# Patient Record
Sex: MALE | Race: WHITE | Employment: FULL TIME | URBAN - METROPOLITAN AREA
[De-identification: names, ages, dates, MRNs, and addresses within clinical notes are randomized per-mention and may not be internally consistent; named-entity substitution may affect disease eponyms.]

---

## 2021-01-07 ENCOUNTER — HOSPITAL ENCOUNTER (OUTPATIENT)
Dept: PHYSICAL THERAPY | Age: 46
Discharge: HOME | End: 2021-01-07
Payer: COMMERCIAL

## 2021-01-07 PROCEDURE — 97161 PT EVAL LOW COMPLEX 20 MIN: CPT

## 2021-01-07 NOTE — PROGRESS NOTES
[address] [facility] [address]  Phone: [phone] Fax: [phone] / STATEMENT OF MEDICAL NECESSITY FOR PHYSICAL THERAPY SERVICES  Patient Name: Vargas Cerda : 1975   Medical   Diagnosis: Chronic lower back pain [M54.5, G89.29] Treatment Diagnosis: Chronic LBP   Onset Date: 15 years ago      Referral Source: Fab Preston MD Start of Care [facility]): 2021   Prior Hospitalization: See medical history Provider #: [id number]   Prior Level of Function: Running 2 miles often   Comorbidities: None 1   Medications: Verified on Patient Summary List   The Plan of Care and following information is based on the information from the initial evaluation.   ========================================================================  Assessment / key information:  Pt is a 42 yo male s/p new onset of back pain 15 years ago with no WOLF. Main c/o pain is L > R lumbar spine pain that only bothers him after sleeping 6 hours and after long duration standing > 3-4 hours. 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 CC is back pain that wakes him from sleep. He can sleep 6 hours and then must do a stretching routine to avoid back pain further into the night. Several weeks ago pt noted an increase in intensity secondary to heavy lifting, moving, chopping wood (20 min). Pt can sit for 6 hours in the car to drive to [place] with no limitations. Social: running 2 miles (running 1 mile and run/walk the second); 10 min pace on ave  Sleeping positions: when pain is bad he sleeps prone over a large pillow and a pillow under R arm with R C/S rotation. Also sleeps SL with pillow between legs with an improvement in pain.  Pt worse with a mattress >1 year old. Pt can sleep supine without excessive issue. When he wakes at 6 hours, he does: forward flexion with (B) bias 15\", SB stretching in standing, forward quadrant stretching, lumbar rotation into open book position. Previous rx has included the following: exercise, nightly stretching, change of mattress, imaging noting L5 sacralization; MD noting DDD is more likely working dx to account for his LBP. He presents with pain ranging from 0-6/10, located lumbar spine, paraspainals at L4-5 and just proximal to the L PSIS. Pain is made worse with pronged sleeping > 6 hours, standing >3-4, tall kneeling and rotational activities for long duration; better with light to moderate activities, transitional movements often. Denies  red flags, no Bowel and bladder sxs. Pt has no c/o radicular sxs in the (B) LE. Notes no weakness, foot drop. Visual Inspection reveals WNL. Palpation reveals TTP L4-5 paraspinals, just proximal to L PSIS, L > R QL. Possible L spinal rotation at L4. Posture fair to poor in sitting. Gait WNL with adequate and equal trunk rotation, hip extension. Sensation is intact to light touch. L/S AROM: flexion to ankles, extension 50%, %, % with L c/o stretching, LRotation 100% with stretching; PROM rotation p!;  RRotation 100% with stretching, PROM no pain. Hip Flexion: AROM 120 ; Prone IR/ER: R WNL and no pain, L pain with end range IR. MMT LEs: hip flexion5/5, extension 4+/5 with proper hip ext firing crossed patterns, abduction 4+/5, adduction NT, knee flexion 4+/5, knee extension 5/5, ankle 5/5. Core stability is bridge 75% with no pain. Full plank 30\". With weakness  Repeated movement testing reveals flexion bias. (-) Special tests include: slump, Slr, SIJ cluster testing.  (+) Special tests include: HAMIDA + for tightness. HS 90/90 (R) +, (L) +.; ELy + (B).   Liam: L  Hip flexor;  R ITB, hip flexor  Functional limitations include: sleeping >6 hours, chopping wood, moving things, lifting, prolonged standing > 3-4 hours; tall kneeling with rotation. Pt will benefit from PT interventions to address the aforementioned deficits and allow pt to return to PLOF.   ========================================================================  Eval Complexity: History: LOW Complexity : Zero comorbidities / personal factors that will impact the outcome / POCExam:HIGH Complexity : 4+ Standardized tests and measures addressing body structure, function, activity limitation and / or participation in recreation  Presentation: MEDIUM Complexity : Evolving with changing characteristics  Clinical Decision Making:LOW Complexity : FOTO score of 75-100Overall Complexity:LOW   Problem List: pain affecting function, decrease ROM, decrease strength, impaired gait/ balance, decrease ADL/ functional abilitiies, decrease activity tolerance, decrease flexibility/ joint mobility and decrease transfer abilities   Treatment Plan may include any combination of the following: Therapeutic exercise, Therapeutic activities, Neuromuscular re-education, Physical agent/modality, Gait/balance training, Manual therapy, Aquatic therapy, Patient education, Self Care training, Functional mobility training, Home safety training and Stair training  Patient / Family readiness to learn indicated by: asking questions, trying to perform skills and interest  Persons(s) to be included in education: patient (P)  Barriers to Learning/Limitations:  none  Measures taken:    Patient Goal (s): \"Better Sleep; at least 8 hours\"   Patient self reported health status: excellent  Rehabilitation Potential: excellent   Short Term Goals: To be accomplished in  2  treatments:  1. Pt will be independent and compliant with HEP to decrease pain, increase ROM and return pt to PLOF.  Long Term Goals: To be accomplished in  8-12  treatments:  1.  Pt will increase score on the FOTO to > or = 86/100 to demo an increase in functional activity tolerance. 2. Pt will be able to self-manage ave pain to < or = 1/10 to improve ease of posture, mobility and self-care  3. Pt will be able to sleep >  Or = 8 hours at a time without waking due to pain to restore his PLOF. 4. Pt will have a negative Liam test (B) to improve his pelvic and spinal mobility and decrease co pain. Frequency / Duration:   Patient to be seen  2  times per month for 2-4   treatments:  Patient / Caregiver education and instruction: self care, activity modification and exercises  Therapist Signature: Dion Gillespie DPT Date: 0/3/4331   Certification Period: Na  Time: 9:33 AM   ========================================================================  I certify that the above Physical Therapy Services are being furnished while the patient is under my care. I agree with the treatment plan and certify that this therapy is necessary. Physician Signature:        Date:       Time:   Please sign and return to [facility] or you may fax the signed copy to [phone]. Thank you.

## 2021-01-07 NOTE — PROGRESS NOTES
PT DAILY TREATMENT NOTE     Patient Name: Fitz Álvarez  Date:2021  : 1975  [x]  Patient  Verified  Payor: BLUE CROSS / Plan: [address] [place] [address] / Product Type: PPO /    In time:12;19  Out time:1:05  Total Treatment Time (min): 46  Total Timed Codes (min): 46  1:1 Treatment Time (min): 55   Visit #: 1 of 4    Treatment Area: Chronic lower back pain [M54.5, G89.29]    SUBJECTIVE  Pain Level (0-10 scale): 0  Any medication changes, allergies to medications, adverse drug reactions, diagnosis change, or new procedure performed?: [x] No    [] Yes (see summary sheet for update)  Subjective functional status/changes:   [] No changes reported  SEE IE for Subjective Information    OBJECTIVE              x min Patient Education: [x] Review HEP    [] Progressed/Changed HEP based on:   [] positioning   [] body mechanics   [] transfers   [] heat/ice application        Other Objective/Functional Measures:   Review HEP     Pain Level (0-10 scale) post treatment: 0    ASSESSMENT/Changes in Function: flexion bias. Assess response to HEP including flexion and stretching for asymmetries. Increase core strength. Possible Mobs to lumbar spine NV. Patient will continue to benefit from skilled PT services to modify and progress therapeutic interventions, address functional mobility deficits, address ROM deficits, address strength deficits, analyze and address soft tissue restrictions, analyze and cue movement patterns, analyze and modify body mechanics/ergonomics, assess and modify postural abnormalities, address imbalance/dizziness and instruct in home and community integration to attain remaining goals.      [x]  See Plan of Care  []  See progress note/recertification  []  See Discharge Summary         Progress towards goals / Updated goals:  SEE IE FOR GOALS     PLAN  []  Upgrade activities as tolerated     []  Continue plan of care  []  Update interventions per flow sheet       []  Discharge due to:_  [x]  Other:Initiate POC as stated in the IE      Justification for Eval Code Complexity:  Patient History : none  Examination see IE  Clinical Presentation: evolving with changing   Clinical Decision Making : FOTO 83/100     Liberty Julien DPT 1/7/2021  9:32 AM    Future Appointments   Date Time Provider Sanjuana Hampton   1/7/2021 12:15 PM Antonina Ribera, PT [facility]

## 2021-01-14 NOTE — PROGRESS NOTES
Request for use of Dry Needling/Intramuscular Manual Therapy  Patient: Rico Morgan     Referral Source: Amber Ramos MD  Diagnosis: Chronic lower back pain [M54.5, G89.29]      : 1975  Date of initial visit: 21   Attended visits: 1  Missed Visits: 0    Based on findings from the physical therapy examination and evaluation, the evaluating therapist believes the patient, Rico Morgan  would benefit from including Dry Needling as part of the plan of care. Dry needling is a treatment technique utilized in conjunction with other PT interventions to inactivate myofascial trigger points and the pain and dysfunction they cause. Dry Needling is an advanced procedure that requires additional training of intensive course work. PROCEDURE:   Solid filament sterile needle (typically 0.3mm/30 gauge) inserted into a trigger point   Repeated movements inactivate the trigger points, taking 30-60 seconds per site   Typically consists of 1 dry needling session per week and a possible second treatment including muscle re-education, flexibility, strengthening and other manual techniques to facilitate the benefits of dry needling     BENEFITS:   Inactivation of trigger points   Decreased pain   Increased muscle length   Improved movement patterns   Restoration of function POTENTIAL RISKS:   Post-needling soreness   Infection   Bruising/bleeding   Penetration of a nerve   Pneumothorax   All treating PTs have been thoroughly educated in avoiding adverse reactions    If you agree with this recommendation, please sign this form and fax it to us at [phone]. If you have questions or concerns regarding dry needling or any other treatment we may be providing, please contact us at [phone]    Thank you for allowing us to assist in the care of your patient.   Landry Gillespie, PT    2021 10:00 AM     NOTE TO PHYSICIAN:  PLEASE COMPLETE THE ORDERS BELOW AND   FAX TO [facility]: [phone]  If you are unable to process this request in 24 hours please contact our office:   [phone] I have read the above request and AGREE to the recommendation of including dry needling as part of the plan of care. ? I have read the above request and DO NOT AGREE to including dry needling as part of the plan of care.   ? I have read the above report and request that my patient continue therapy with the following changes/special instructions:  ________________________________________________________________________    Physicians signature: _______________________________________________     Date: ______________Time:_______________

## 2021-01-21 ENCOUNTER — HOSPITAL ENCOUNTER (OUTPATIENT)
Dept: PHYSICAL THERAPY | Age: 46
End: 2021-01-21
Payer: COMMERCIAL

## 2021-01-28 ENCOUNTER — HOSPITAL ENCOUNTER (OUTPATIENT)
Dept: PHYSICAL THERAPY | Age: 46
Discharge: HOME | End: 2021-01-28
Payer: COMMERCIAL

## 2021-01-28 PROCEDURE — 97110 THERAPEUTIC EXERCISES: CPT

## 2021-01-28 PROCEDURE — 97112 NEUROMUSCULAR REEDUCATION: CPT

## 2021-01-28 PROCEDURE — 97140 MANUAL THERAPY 1/> REGIONS: CPT

## 2021-01-28 NOTE — PROGRESS NOTES
PT DAILY TREATMENT NOTE 8-    Patient Name: Faraz Profit  Date:2021  : 1975  [x]  Patient  Verified  Payor: BLUE CROSS / Plan: [address] [place] [address] / Product Type: PPO /    In time:5:52  Out time:6:39  Total Treatment Time (min): 47  Total Timed Codes (min): 47  1:1 Treatment Time (min): 47   Visit #: 2 of 4    Treatment Area: Chronic lower back pain [M54.5, G89.29]    SUBJECTIVE  Pain Level (0-10 scale): 0  Any medication changes, allergies to medications, adverse drug reactions, diagnosis change, or new procedure performed?: [x] No    [] Yes (see summary sheet for update)  Subjective functional status/changes:   [] No changes reported  Friday last I was sitting in my office chair and I fell to the floor and re-injured my R shoulder. So i'm sore in the R arm. I did the flexion exercises: I felt It hard to get to that stretch space for that particular area in my back. So I'm not sure that I felt more changes. Although my lower back hasn't been bothering me during the day at all, but just in the morning. The stretch that helps me is the crossover knee to chest and lumbar flexion stretch.      OBJECTIVE    15 min Therapeutic Exercise:  [x] See flow sheet : initiate new HEP, postural training     Rationale: increase ROM and increase strength to improve the patients ability to perform tall kneeling, sustained prone positioning for sleep     10 min Manual Therapy:  MET for L L4 rotation (L SL in lumbar flexion to point of L4 movement; then  Raise feet with L hip going into ER, R hip going into IR; perform MET isometric contraction with PT for 5-8\", 3-4 times, increasing hip ROM each rep) ; IASTM to (L) L4 paraspinals and QL   Rationale: decrease pain, increase ROM and increase tissue extensibility to ipmrove ease of sustained prone positioning for sleep, tall kneeling positions   The manual therapy interventions were performed at a separate and distinct time from the therapeutic activities interventions   (na Add 59 Modifier in conjunction with TA treatment)     22 min Neuromuscular Re-education:  [x]  See flow sheet : initiate all NMR on flow   Rationale: improve coordination and increase proprioception  to improve the patients ability to sustain tall kneeling, lumbar flexion, standing, walking, working          x min Patient Education: [x] Review HEP    [x] Progressed/Changed HEP based on: reviewed scorpion stretch (B) on large mat table; new HEP, to do MET at home daily. Review possible sleeping positions of supine with wedge for lumbar flexion; semi prone and semi supine with pillow support. [] positioning   [] body mechanics   [] transfers   [] heat/ice application        Other Objective/Functional Measures:   Side glide: pain with (B) side glies; noted more as stretch and min pain  Extension: pain during movement   Rotation: 100% and no pain  SB R with L pain      LIfts in tall kneeling from L hip to the R: pt notes mm fatigue in the area where he c/o pain typically. Pain Level (0-10 scale) post treatment: 0    ASSESSMENT/Changes in Function: Pt with neutral lumbar rotation following MET. Verbalize understanding of MET at home and new HEP (green band given). Pt with core weakness demo'ed throughout program today but ady in weakness and in tall kneeling, SLS positions. Patient will continue to benefit from skilled PT services to modify and progress therapeutic interventions, address functional mobility deficits, address ROM deficits, address strength deficits, analyze and address soft tissue restrictions, analyze and cue movement patterns, analyze and modify body mechanics/ergonomics, assess and modify postural abnormalities, address imbalance/dizziness and instruct in home and community integration to attain remaining goals.      []  See Plan of Care  []  See progress note/recertification  []  See Discharge Summary         Progress towards goals / Updated goals:  · Short Term Goals: To be accomplished in  2  treatments:  1. Pt will be independent and compliant with HEP to decrease pain, increase ROM and return pt to PLOF. Current: notes compliance  (1/28/2021)    · Long Term Goals: To be accomplished in  8-12  treatments:  1. Pt will increase score on the FOTO to > or = 86/100 to demo an increase in functional activity tolerance. 2. Pt will be able to self-manage ave pain to < or = 1/10 to improve ease of posture, mobility and self-care  Current: Pt with less pain during the day; con't to note pain in the mornings (1/28/21)   3. Pt will be able to sleep >  Or = 8 hours at a time without waking due to pain to restore his PLOF. Current: Slow progressing, with 6 hours and then needing to shift positions (1/28/2021)  4. Pt will have a negative Liam test (B) to improve his pelvic and spinal mobility and decrease co pain. Current: will formally reassess NV (1/28/2021)    PLAN  [x]  Upgrade activities as tolerated     [x]  Continue plan of care  []  Update interventions per flow sheet       []  Discharge due to:_  [x]  Other:_ assess for L4 rotation, pain and increased ROm/sleeping tolerance.       Silva Banuelos, PT 1/28/2021  12:59 PM    Future Appointments   Date Time Provider Sanjuana Hampton   1/28/2021  5:45 PM Liyah Wade, PT [facility]   2/4/2021 11:30 AM Liyah Wade, PT [facility]

## 2021-02-04 ENCOUNTER — HOSPITAL ENCOUNTER (OUTPATIENT)
Dept: PHYSICAL THERAPY | Age: 46
Discharge: HOME | End: 2021-02-04
Payer: COMMERCIAL

## 2021-02-04 PROCEDURE — 97140 MANUAL THERAPY 1/> REGIONS: CPT

## 2021-02-04 PROCEDURE — 97112 NEUROMUSCULAR REEDUCATION: CPT

## 2021-02-04 PROCEDURE — 97110 THERAPEUTIC EXERCISES: CPT

## 2021-02-04 NOTE — PROGRESS NOTES
[address] [facility] [address]  Phone: [phone] Fax: [phone]  DISCHARGE NOTE  Patient Name: Michelle Bryant : 1975   Treatment/Medical Diagnosis: Chronic lower back pain [M54.5, G89.29]   Referral Source: Alec Freeman MD     Date of Initial Visit: 21 Attended Visits: 3 Missed Visits: 0     SUMMARY OF TREATMENT   Pt is a 40 yo male s/p new onset of back pain 15 years ago with no WOLF. Main c/o pain is L > R lumbar spine pain that only bothers him after sleeping 6 hours and after long duration standing > 3-4 hours. Ther ex including strengthening, ROM, flexibility, stabilization; manual therapy including: MET for L4 L rotation, IASTM, hip stretching;  Patient education; HEP, NMRed    CURRENT STATUS  Pt is making good objective progress in therapy with an increase in ROM, strength, core stab and functional movement tolerance. However he con't to note limitations with sustained sleeping > 6 hours, in prone positioning. Pt will attempt to purchase new mattress to provide support and also to address any other non Musculoskeletal related sleeping limitations. He demo's an improvement in core stab and mm flexibility but still limited in hip flexor and quad length which reproduces c/o LBP; pt educated on importance of flexibility routine during the week. Pt goal: have the weekly routine of HEP that keeps me from getting deconditioned and getting pain when I go do higher level activities. FOTO 94/100   Pain ranges: 0 to 3-4/10; ave 0/10      L/S AROM: flexion to floor, extension 100%, LSB 100%, RSB 100% with L c/o stretching, LRotation 100% with stretching; PROM rotation p!;  RRotation 100% with stretching. (B) side glide 100% and min stretch felt with R side glide.     Hip Flexion: AROM 120 ; Prone IR/ER: B WNL and no pain,   MMT LEs: hip flexion5/5, extension 4+/5 with proper hip ext firing crossed patterns, abduction 5/5, adduction NT, knee flexion 4+/5, knee extension 5/5, ankle 5/5. Core stability is bridge 100% with no pain.  Full plank 43\"  Repeated movement testing reveals flexion bias. Min L rotation of L4        (-) Special tests include: slump, Slr, SIJ cluster testing.  (+) Special tests include: HAMIDA + for tightness minimally    HS 90/90 (R) +, (L) +.; ELy + (B) with c/o LBP R > L.  Liam: L  Hip flexor;  R ITB, hip flexor    Functional limitations include: sleeping >6 hours, chopping wood (have not done it but could do it);  moving things, prolonged standing > 3-4 hours (has not tested yet); tall kneeling with rotation (has not tested yet); Improvements: lifting, moving, carrying;       · Short Term Goals: To be accomplished in  2  treatments:  1.  Pt will be independent and compliant with HEP to decrease pain, increase ROM and return pt to PLOF. Current: Goal met notes compliance  (2/4/2021)    · Long Term Goals: To be accomplished in  8-12  treatments:  1. Pt will increase score on the FOTO to > or = 86/100 to demo an increase in functional activity tolerance. Goal met at 94/100 (2/4/21)  2.  Pt will be able to self-manage ave pain to < or = 1/10 to improve ease of posture, mobility and self-care  Current: Goal met with 0/10 ave pain during theday; max pain 3-4/10  (2/4/21)   3. Pt will be able to sleep >  Or = 8 hours at a time without waking due to pain to restore his PLOF.    Current: Slow progressing, with 6 hours and then needing to shift positions; discussed other possible limitations to sleep  (2/4/2021)  4. Pt will have a negative Liam test (B) to improve his pelvic and spinal mobility and decrease co pain.   Current: Goal progressing but with + L for hip flexor and + R for ITB and hip flexor; pt notes intermittent compliance with stretching routine (2/4/21)     G-Codes: NA  RECOMMENDATIONS  Recommend D/C as pt has made some functional gains; pt will make some changes/alterations at home with HEP compliance, bed firmness and with core strength program to con't to progress in some work hardening activities. If you have any questions/comments please contact us directly at [phone]. Thank you for allowing us to assist in the care of your patient. Therapist Signature: Janine Burr DPT Date: 2/4/2021     Time: 9:36 AM   NOTE TO PHYSICIAN:  PLEASE COMPLETE THE ORDERS BELOW AND FAX TO   [facility]: [phone]. If you are unable to process this request in 24 hours please contact our office: [phone].  ___ I have read the above report and request that my patient continue as recommended.   ___ I have read the above report and request that my patient continue therapy with the following changes/special instructions:_________________________________________________________   ___ I have read the above report and request that my patient be discharged from therapy.      Physician Signature:        Date:       Time:

## 2021-02-04 NOTE — PROGRESS NOTES
PT DAILY TREATMENT NOTE     Patient Name: Tam Rosario  Date:2021  : 1975  [x]  Patient  Verified  Payor: [employer] [place] / Plan: [address] [place] [address] / Product Type: PPO /    In time:11:27  Out time:12:14  Total Treatment Time (min): 47  Total Timed Codes (min): 47  1:1 Treatment Time (min): 47   Visit #: 3 of 4    Treatment Area: Chronic lower back pain [M54.5, G89.29]    SUBJECTIVE  Pain Level (0-10 scale): 0  Any medication changes, allergies to medications, adverse drug reactions, diagnosis change, or new procedure performed?: [x] No    [] Yes (see summary sheet for update)  Subjective functional status/changes:   [] No changes reported  The shoulder is better  Deficits: the prone sleeping for > 6 hours is still hard  I can tell that doing some diagonals and core control is needed  Hard to do resistance training at home. want to get a firmer mattress; so it was hard to do the MET rotational movements. Modified plank: feels good for exercising in general.        Pt goal: have the weekly routine of HEP that keeps me from getting deconditioned and getting pain when I go do higher level activities.       OBJECTIVE    17 min Therapeutic Exercise:  [x] See flow sheet : reassessment    Rationale: increase ROM, increase strength and improve coordination to improve the patients ability to control ADLs, mobility,  Work activities     10 min Manual Therapy:  MET for L L4 rotation (L SL in lumbar flexion to point of L4 movement; then  Raise feet with L hip going into ER, R hip going into IR; perform MET isometric contraction with PT for 5-8\", 3-4 times, increasing hip ROM each rep) ; IASTM to (L) L4 paraspinals and QL   Rationale: decrease pain, increase ROM, increase tissue extensibility and decrease trigger points to improve ease of rotation, half kneeling, sleeping prone   The manual therapy interventions were performed at a separate and distinct time from the therapeutic activities interventions (na Add 61 Modifier in conjunction with TA treatment)    20 min Neuromuscular Re-education:  [x]  See flow sheet :   Rationale: improve coordination, improve balance and increase proprioception  to improve the patients ability to improve spinal, postural control for ADLs, work, mobility           x min Patient Education: [x] Review HEP    [x] Progressed/Changed HEP based on:  Final D/C planning; reviewed current HEP and modifed Liam to a standing hip flexor stretch   HEP progression tall kneeling, 1/2 kneeling and semisquatting to simulate functional positions for his work and hobbies     [] positioning   [] body mechanics   [] transfers   [] heat/ice application        Other Objective/Functional Measures:   FOTO 94/100   Pain ranges: 0 to 3-4/10; ave 0/10     L/S AROM: flexion to floor, extension 100%, %, % with L c/o stretching, LRotation 100% with stretching; PROM rotation p!;  RRotation 100% with stretching. (B) side glide 100% and min stretch felt with R side glide. Hip Flexion: AROM 120 ; Prone IR/ER: B WNL and no pain,   MMT LEs: hip flexion5/5, extension 4+/5 with proper hip ext firing crossed patterns, abduction 5/5, adduction NT, knee flexion 4+/5, knee extension 5/5, ankle 5/5. Core stability is bridge 100% with no pain. Full plank 43\"  Repeated movement testing reveals flexion bias.      (-) Special tests include: slump, Slr, SIJ cluster testing.  (+) Special tests include: HAMIDA + for tightness minimally    HS 90/90 (R) +, (L) +.; ELy + (B) with c/o LBP R > L. Liam: L  Hip flexor;  R ITB, hip flexor  Functional limitations include: sleeping >6 hours, chopping wood (have not done it but could do it);  moving things, prolonged standing > 3-4 hours (has not tested yet); tall kneeling with rotation (has not tested yet);    Improvements: lifting, moving, carrying;     Pain Level (0-10 scale) post treatment:  0    ASSESSMENT/Changes in Function: see D/C note    Patient will continue to benefit from skilled PT services to modify and progress therapeutic interventions, address functional mobility deficits, address ROM deficits, address strength deficits, analyze and address soft tissue restrictions, analyze and cue movement patterns, analyze and modify body mechanics/ergonomics, assess and modify postural abnormalities, address imbalance/dizziness and instruct in home and community integration to attain remaining goals. []  See Plan of Care  []  See progress note/recertification  [x]  See Discharge Summary         Progress towards goals / Updated goals: · Short Term Goals: To be accomplished in  2  treatments:  1.  Pt will be independent and compliant with HEP to decrease pain, increase ROM and return pt to PLOF. Current: Goal met notes compliance  (2/4/2021)    · Long Term Goals: To be accomplished in  8-12  treatments:  1. Pt will increase score on the FOTO to > or = 86/100 to demo an increase in functional activity tolerance. Goal met at 94/100 (2/4/21)  2.  Pt will be able to self-manage ave pain to < or = 1/10 to improve ease of posture, mobility and self-care  Current: Goal met with 0/10 ave pain during theday; max pain 3-4/10  (2/4/21)   3. Pt will be able to sleep >  Or = 8 hours at a time without waking due to pain to restore his PLOF.    Current: Slow progressing, with 6 hours and then needing to shift positions; discussed other possible limitations to sleep  (2/4/2021)  4. Pt will have a negative Liam test (B) to improve his pelvic and spinal mobility and decrease co pain.   Current: Goal progressing but with + L for hip flexor and + R for ITB and hip flexor; pt notes intermittent compliance with stretching routine (2/4/21)     PLAN  []  Upgrade activities as tolerated     []  Continue plan of care  []  Update interventions per flow sheet       [x]  Discharge due to:_functional gains; pt will make some changes at home with HEP compliance, bed firmness and with core strength program.  []  Other:_      Bill Baez, PT 2/4/2021  9:34 AM    Future Appointments   Date Time Provider Sanjuana Hampton   2/4/2021 11:30 AM Gisele Smith, PT [facility]